# Patient Record
Sex: MALE | URBAN - METROPOLITAN AREA
[De-identification: names, ages, dates, MRNs, and addresses within clinical notes are randomized per-mention and may not be internally consistent; named-entity substitution may affect disease eponyms.]

---

## 2019-04-06 ENCOUNTER — APPOINTMENT (OUTPATIENT)
Dept: CT IMAGING | Age: 74
End: 2019-04-06
Attending: EMERGENCY MEDICINE
Payer: MEDICARE

## 2019-04-06 ENCOUNTER — HOSPITAL ENCOUNTER (EMERGENCY)
Age: 74
Discharge: HOME | End: 2019-04-06
Attending: EMERGENCY MEDICINE | Admitting: EMERGENCY MEDICINE
Payer: MEDICARE

## 2019-04-06 VITALS
DIASTOLIC BLOOD PRESSURE: 91 MMHG | TEMPERATURE: 97.7 F | RESPIRATION RATE: 16 BRPM | HEART RATE: 90 BPM | OXYGEN SATURATION: 97 % | SYSTOLIC BLOOD PRESSURE: 163 MMHG

## 2019-04-06 DIAGNOSIS — I63.29 ACUTE ARTERIAL ISCHEMIC STROKE, VERTEBROBASILAR, CEREBELLAR (HCC): Primary | ICD-10-CM

## 2019-04-06 LAB
ALBUMIN SERPL-MCNC: 3.9 G/DL (ref 3.5–5)
ALBUMIN/GLOB SERPL: 1 {RATIO} (ref 1.1–2.2)
ALP SERPL-CCNC: 95 U/L (ref 45–117)
ALT SERPL-CCNC: 18 U/L (ref 12–78)
AMPHET UR QL SCN: NEGATIVE
ANION GAP SERPL CALC-SCNC: 10 MMOL/L (ref 5–15)
APPEARANCE UR: CLEAR
AST SERPL-CCNC: 11 U/L (ref 15–37)
BACTERIA URNS QL MICRO: NEGATIVE /HPF
BARBITURATES UR QL SCN: NEGATIVE
BASOPHILS # BLD: 0 K/UL (ref 0–0.1)
BASOPHILS NFR BLD: 0 % (ref 0–1)
BENZODIAZ UR QL: NEGATIVE
BILIRUB SERPL-MCNC: 1.1 MG/DL (ref 0.2–1)
BILIRUB UR QL: NEGATIVE
BUN SERPL-MCNC: 16 MG/DL (ref 6–20)
BUN/CREAT SERPL: 13 (ref 12–20)
CALCIUM SERPL-MCNC: 8.8 MG/DL (ref 8.5–10.1)
CANNABINOIDS UR QL SCN: NEGATIVE
CHLORIDE SERPL-SCNC: 97 MMOL/L (ref 97–108)
CO2 SERPL-SCNC: 26 MMOL/L (ref 21–32)
COCAINE UR QL SCN: NEGATIVE
COLOR UR: ABNORMAL
COMMENT, HOLDF: NORMAL
CREAT SERPL-MCNC: 1.27 MG/DL (ref 0.7–1.3)
D DIMER PPP FEU-MCNC: 9.84 MG/L FEU (ref 0–0.65)
DIFFERENTIAL METHOD BLD: ABNORMAL
DRUG SCRN COMMENT,DRGCM: NORMAL
EOSINOPHIL # BLD: 0 K/UL (ref 0–0.4)
EOSINOPHIL NFR BLD: 0 % (ref 0–7)
EPITH CASTS URNS QL MICRO: ABNORMAL /LPF
ERYTHROCYTE [DISTWIDTH] IN BLOOD BY AUTOMATED COUNT: 17.1 % (ref 11.5–14.5)
ETHANOL SERPL-MCNC: <10 MG/DL
GLOBULIN SER CALC-MCNC: 3.8 G/DL (ref 2–4)
GLUCOSE BLD STRIP.AUTO-MCNC: 377 MG/DL (ref 65–100)
GLUCOSE SERPL-MCNC: 405 MG/DL (ref 65–100)
GLUCOSE UR STRIP.AUTO-MCNC: >1000 MG/DL
HCT VFR BLD AUTO: 39.5 % (ref 36.6–50.3)
HGB BLD-MCNC: 12.5 G/DL (ref 12.1–17)
HGB UR QL STRIP: NEGATIVE
HYALINE CASTS URNS QL MICRO: ABNORMAL /LPF (ref 0–5)
IMM GRANULOCYTES # BLD AUTO: 0 K/UL (ref 0–0.04)
IMM GRANULOCYTES NFR BLD AUTO: 0 % (ref 0–0.5)
INR PPP: 1 (ref 0.9–1.1)
KETONES UR QL STRIP.AUTO: 40 MG/DL
LEUKOCYTE ESTERASE UR QL STRIP.AUTO: NEGATIVE
LYMPHOCYTES # BLD: 1 K/UL (ref 0.8–3.5)
LYMPHOCYTES NFR BLD: 12 % (ref 12–49)
MCH RBC QN AUTO: 19.9 PG (ref 26–34)
MCHC RBC AUTO-ENTMCNC: 31.6 G/DL (ref 30–36.5)
MCV RBC AUTO: 62.9 FL (ref 80–99)
METHADONE UR QL: NEGATIVE
MONOCYTES # BLD: 0.3 K/UL (ref 0–1)
MONOCYTES NFR BLD: 4 % (ref 5–13)
NEUTS SEG # BLD: 7 K/UL (ref 1.8–8)
NEUTS SEG NFR BLD: 84 % (ref 32–75)
NITRITE UR QL STRIP.AUTO: NEGATIVE
NRBC # BLD: 0 K/UL (ref 0–0.01)
NRBC BLD-RTO: 0 PER 100 WBC
OPIATES UR QL: NEGATIVE
PCP UR QL: NEGATIVE
PH UR STRIP: 6 [PH] (ref 5–8)
PLATELET # BLD AUTO: 200 K/UL (ref 150–400)
PMV BLD AUTO: 10.2 FL (ref 8.9–12.9)
POTASSIUM SERPL-SCNC: 3.5 MMOL/L (ref 3.5–5.1)
PROT SERPL-MCNC: 7.7 G/DL (ref 6.4–8.2)
PROT UR STRIP-MCNC: NEGATIVE MG/DL
PROTHROMBIN TIME: 10.7 SEC (ref 9–11.1)
RBC # BLD AUTO: 6.28 M/UL (ref 4.1–5.7)
RBC #/AREA URNS HPF: ABNORMAL /HPF (ref 0–5)
RBC MORPH BLD: ABNORMAL
SAMPLES BEING HELD,HOLD: NORMAL
SERVICE CMNT-IMP: ABNORMAL
SODIUM SERPL-SCNC: 133 MMOL/L (ref 136–145)
SP GR UR REFRACTOMETRY: 1.01 (ref 1–1.03)
UROBILINOGEN UR QL STRIP.AUTO: 0.2 EU/DL (ref 0.2–1)
WBC # BLD AUTO: 8.3 K/UL (ref 4.1–11.1)
WBC URNS QL MICRO: ABNORMAL /HPF (ref 0–4)

## 2019-04-06 PROCEDURE — 74011250636 HC RX REV CODE- 250/636

## 2019-04-06 PROCEDURE — 93005 ELECTROCARDIOGRAM TRACING: CPT

## 2019-04-06 PROCEDURE — 81003 URINALYSIS AUTO W/O SCOPE: CPT

## 2019-04-06 PROCEDURE — 80053 COMPREHEN METABOLIC PANEL: CPT

## 2019-04-06 PROCEDURE — 36415 COLL VENOUS BLD VENIPUNCTURE: CPT

## 2019-04-06 PROCEDURE — 85610 PROTHROMBIN TIME: CPT

## 2019-04-06 PROCEDURE — 70496 CT ANGIOGRAPHY HEAD: CPT

## 2019-04-06 PROCEDURE — 85025 COMPLETE CBC W/AUTO DIFF WBC: CPT

## 2019-04-06 PROCEDURE — 80307 DRUG TEST PRSMV CHEM ANLYZR: CPT

## 2019-04-06 PROCEDURE — 74011636320 HC RX REV CODE- 636/320

## 2019-04-06 PROCEDURE — 96374 THER/PROPH/DIAG INJ IV PUSH: CPT

## 2019-04-06 PROCEDURE — 85379 FIBRIN DEGRADATION QUANT: CPT

## 2019-04-06 PROCEDURE — 99285 EMERGENCY DEPT VISIT HI MDM: CPT

## 2019-04-06 PROCEDURE — 70450 CT HEAD/BRAIN W/O DYE: CPT

## 2019-04-06 PROCEDURE — 0042T CT PERF W CBF: CPT

## 2019-04-06 PROCEDURE — 82962 GLUCOSE BLOOD TEST: CPT

## 2019-04-06 RX ORDER — ONDANSETRON 2 MG/ML
INJECTION INTRAMUSCULAR; INTRAVENOUS
Status: COMPLETED
Start: 2019-04-06 | End: 2019-04-06

## 2019-04-06 RX ORDER — ONDANSETRON 2 MG/ML
4 INJECTION INTRAMUSCULAR; INTRAVENOUS
Status: COMPLETED | OUTPATIENT
Start: 2019-04-06 | End: 2019-04-06

## 2019-04-06 RX ADMIN — ONDANSETRON 4 MG: 2 INJECTION INTRAMUSCULAR; INTRAVENOUS at 11:38

## 2019-04-06 RX ADMIN — IOPAMIDOL 120 ML: 755 INJECTION, SOLUTION INTRAVENOUS at 10:33

## 2019-04-06 NOTE — ED NOTES
Correct new patient being arrived. Able to correctly identify patient with license brought by daughter and information obtained through blue phone and her. Patient was originally arrived by triage RN and was difficult to obtain information from patient at first due to dizzy state and thick accent. Charge nurse aware of current situation.

## 2019-04-06 NOTE — ED PROVIDER NOTES
[facility] 
Emergency Department History & Physical Exam 
 
 
Patient Information Date of Service: 4/6/2019 Patient Name: Ricardo Coleman YOB: 1945 Medical Record Number: [id number] Primary Care Provider: No primary care provider on file. Specialists: 
 
 
 
 
History of Present Illness History Provided By:  patient Chief Complaint Patient presents with  Lethargy  
  pt started with eye pain and weakness this morning History of Present Illness:  
     Ricardo Coleman is a 74 y.o. male with no known medical history presents with odd neurologic symptoms. He is a very poor historian and cannot really give any history and there is no family at the bedside at this time. Nursing staff reports that a family member drove him to the front of the emergency department and that the nursing staff had to help him out of the vehicle and he seemed to be listing to the left side. On my questioning all he can tell me is that \"my eyes turn me over\". He can follow commands but is otherwise moaning and rolling around in the bed and questions. Past Medical History No past medical history on file. No past surgical history on file. No family history on file. Social History Tobacco Use  Smoking status: Not on file Substance Use Topics  Alcohol use: Not on file  Drug use: Not on file No Known Allergies No current facility-administered medications for this encounter. No current outpatient medications on file. Review of Systems Review of Systems Unable to perform ROS: Mental status change Physical Exam  
Vital Signs Patient Vitals for the past 12 hrs: 
 Temp Pulse Resp BP SpO2  
04/06/19 1126  90 16 (!) 163/91 97 % 04/06/19 1019 97.7 °F (36.5 °C) 77 15 (!) 176/108 100 % Physical Exam  
Constitutional: He appears well-developed and well-nourished. He is cooperative. On examination he is a elderly thin -American man laying in bed. He moans and rise around from time to time. His vital signs are normal other than an elevated blood pressure of 176/108. He is afebrile. HENT:  
Head: Normocephalic and atraumatic. Mouth/Throat: Oropharynx is clear and moist.  
 HEENT reveals no trauma and no meningismus signs are next stiffness. Eyes: Conjunctivae and EOM are normal. Right eye exhibits no discharge. Left eye exhibits no discharge. No scleral icterus. Neck: Normal range of motion and phonation normal. Neck supple. No JVD present. No thyromegaly present. Cardiovascular: Normal rate, regular rhythm, S1 normal, S2 normal, normal heart sounds and intact distal pulses. Exam reveals no gallop and no friction rub. No murmur heard. Pulmonary/Chest: Effort normal and breath sounds normal. No accessory muscle usage. No respiratory distress. He has no wheezes. He has no rhonchi. He has no rales. Abdominal: Soft. Normal appearance and bowel sounds are normal. He exhibits no distension and no pulsatile midline mass. There is no tenderness. There is no rebound and no guarding. Musculoskeletal: Normal range of motion. He exhibits no edema or deformity. Lymphadenopathy:  
     Head (right side): No submandibular adenopathy present. He has no cervical adenopathy. Neurological: He is alert and oriented to person and place and follows commands. His pupils are midsize and reactive bilaterally. Cranial nerves II through XII appear to be grossly intact bilaterally except for possibly a very mild left-sided facial droop although the patient is not very cooperative with exam so difficult to tell. He has 5 out of 5 upper extremity and lower extremity strength and normal sensation to light touch in all 4 extremities. He does not have a pronator drift.   Initially told me that he was unable to stand but with a little bit of assistance he could stand at the bedside without any assistance. He stood there with his eyes closed. When told to sit back down he flop back down on the bed onto his left side. Skin: Skin is warm and dry. No rash noted. Psychiatric: He has a normal mood and affect. His speech is normal.  
Nursing note and vitals reviewed. Labs, Radiology, EKG, Procedures, Etc. Labs:  
  
Recent Results (from the past 12 hour(s)) GLUCOSE, POC Collection Time: 04/06/19 10:20 AM  
Result Value Ref Range Glucose (POC) 377 (H) 65 - 100 mg/dL Performed by Radha Graham CBC WITH AUTOMATED DIFF Collection Time: 04/06/19 10:26 AM  
Result Value Ref Range WBC 8.3 4.1 - 11.1 K/uL  
 RBC 6.28 (H) 4.10 - 5.70 M/uL  
 HGB 12.5 12.1 - 17.0 g/dL HCT 39.5 36.6 - 50.3 % MCV 62.9 (L) 80.0 - 99.0 FL  
 MCH 19.9 (L) 26.0 - 34.0 PG  
 MCHC 31.6 30.0 - 36.5 g/dL  
 RDW 17.1 (H) 11.5 - 14.5 % PLATELET 593 455 - 409 K/uL MPV 10.2 8.9 - 12.9 FL  
 NRBC 0.0 0  WBC ABSOLUTE NRBC 0.00 0.00 - 0.01 K/uL NEUTROPHILS 84 (H) 32 - 75 % LYMPHOCYTES 12 12 - 49 % MONOCYTES 4 (L) 5 - 13 % EOSINOPHILS 0 0 - 7 % BASOPHILS 0 0 - 1 % IMMATURE GRANULOCYTES 0 0.0 - 0.5 % ABS. NEUTROPHILS 7.0 1.8 - 8.0 K/UL  
 ABS. LYMPHOCYTES 1.0 0.8 - 3.5 K/UL  
 ABS. MONOCYTES 0.3 0.0 - 1.0 K/UL  
 ABS. EOSINOPHILS 0.0 0.0 - 0.4 K/UL  
 ABS. BASOPHILS 0.0 0.0 - 0.1 K/UL  
 ABS. IMM. GRANS. 0.0 0.00 - 0.04 K/UL  
 DF SMEAR SCANNED    
 RBC COMMENTS MICROCYTOSIS 
2+ PROTHROMBIN TIME + INR Collection Time: 04/06/19 10:26 AM  
Result Value Ref Range INR 1.0 0.9 - 1.1 Prothrombin time 10.7 9.0 - 11.1 sec D DIMER Collection Time: 04/06/19 10:26 AM  
Result Value Ref Range D-dimer 9.84 (H) 0.00 - 0.65 mg/L FEU METABOLIC PANEL, COMPREHENSIVE Collection Time: 04/06/19 10:26 AM  
Result Value Ref Range Sodium 133 (L) 136 - 145 mmol/L Potassium 3.5 3.5 - 5.1 mmol/L  Chloride 97 97 - 108 mmol/L  
 CO2 26 21 - 32 mmol/L Anion gap 10 5 - 15 mmol/L Glucose 405 (H) 65 - 100 mg/dL BUN 16 6 - 20 MG/DL Creatinine 1.27 0.70 - 1.30 MG/DL  
 BUN/Creatinine ratio 13 12 - 20 GFR est AA >60 >60 ml/min/1.73m2 GFR est non-AA 55 (L) >60 ml/min/1.73m2 Calcium 8.8 8.5 - 10.1 MG/DL Bilirubin, total 1.1 (H) 0.2 - 1.0 MG/DL  
 ALT (SGPT) 18 12 - 78 U/L  
 AST (SGOT) 11 (L) 15 - 37 U/L Alk. phosphatase 95 45 - 117 U/L Protein, total 7.7 6.4 - 8.2 g/dL Albumin 3.9 3.5 - 5.0 g/dL Globulin 3.8 2.0 - 4.0 g/dL A-G Ratio 1.0 (L) 1.1 - 2.2 SAMPLES BEING HELD Collection Time: 04/06/19 10:26 AM  
Result Value Ref Range SAMPLES BEING HELD RED,GREEN   
 COMMENT Add-on orders for these samples will be processed based on acceptable specimen integrity and analyte stability, which may vary by analyte. ETHYL ALCOHOL Collection Time: 04/06/19 10:26 AM  
Result Value Ref Range ALCOHOL(ETHYL),SERUM <10 <10 MG/DL  
EKG, 12 LEAD, INITIAL Collection Time: 04/06/19 10:56 AM  
Result Value Ref Range Ventricular Rate 88 BPM  
 Atrial Rate 88 BPM  
 P-R Interval 240 ms QRS Duration 92 ms Q-T Interval 376 ms QTC Calculation (Bezet) 454 ms Calculated P Axis 69 degrees Calculated R Axis 14 degrees Calculated T Axis 11 degrees Diagnosis Sinus rhythm with 1st degree AV block Moderate voltage criteria for LVH, may be normal variant Nonspecific T wave abnormality No previous ECGs available DRUG SCREEN, URINE Collection Time: 04/06/19 11:06 AM  
Result Value Ref Range AMPHETAMINES NEGATIVE  NEG    
 BARBITURATES NEGATIVE  NEG BENZODIAZEPINES NEGATIVE  NEG    
 COCAINE NEGATIVE  NEG METHADONE NEGATIVE  NEG    
 OPIATES NEGATIVE  NEG    
 PCP(PHENCYCLIDINE) NEGATIVE  NEG    
 THC (TH-CANNABINOL) NEGATIVE  NEG Drug screen comment (NOTE) URINALYSIS W/ RFLX MICROSCOPIC Collection Time: 04/06/19 11:06 AM  
Result Value Ref Range Color YELLOW/STRAW Appearance CLEAR CLEAR Specific gravity 1.007 1.003 - 1.030    
 pH (UA) 6.0 5.0 - 8.0 Protein NEGATIVE  NEG mg/dL Glucose >1,000 (A) NEG mg/dL Ketone 40 (A) NEG mg/dL Bilirubin NEGATIVE  NEG Blood NEGATIVE  NEG Urobilinogen 0.2 0.2 - 1.0 EU/dL Nitrites NEGATIVE  NEG Leukocyte Esterase NEGATIVE  NEG    
 WBC 0-4 0 - 4 /hpf  
 RBC 0-5 0 - 5 /hpf Epithelial cells FEW FEW /lpf Bacteria NEGATIVE  NEG /hpf Hyaline cast 0-2 0 - 5 /lpf Radiologic Studies: CTA CODE NEURO HEAD AND NECK W CONT Final Result Impression: No evidence for acute large vessel arterial occlusion or significant stenosis. CT perfusion brain: Questionable focal decreased perfusion and increased blood  
volume in the peripheral cerebellar hemispheres. Nonspecific focal hypoattenuation in the peripheral left cerebellum is again  
seen. CT PERF W CBF Final Result Impression: No evidence for acute large vessel arterial occlusion or significant stenosis. CT perfusion brain: Questionable focal decreased perfusion and increased blood  
volume in the peripheral cerebellar hemispheres. Nonspecific focal hypoattenuation in the peripheral left cerebellum is again  
seen. CT CODE NEURO HEAD WO CONTRAST Final Result IMPRESSION:   
1. Round hypodensity within the left occipital lobe. Recommend MR imaging to  
help differentiate mass lesion from infarct CT Results  (Last 48 hours) 04/06/19 1055  CTA CODE NEURO HEAD AND NECK W CONT Final result Impression:  Impression: No evidence for acute large vessel arterial occlusion or significant stenosis. CT perfusion brain: Questionable focal decreased perfusion and increased blood  
volume in the peripheral cerebellar hemispheres. Nonspecific focal hypoattenuation in the peripheral left cerebellum is again  
seen. Narrative:  CLINICAL HISTORY: Eye pain and weakness EXAMINATION:  CT ANGIOGRAPHY HEAD AND NECK COMPARISON: CT head 4/6/2019 TECHNIQUE:  Following the uneventful administration of iodinated contrast  
material, axial CT angiography of the head and neck was performed. Delayed axial  
images through the head were also obtained. Coronal and sagittal reconstructions  
were obtained. Manual postprocessing of images was performed. 3-D  Sagittal  
maximal intensity projection images were obtained. 3-D Coronal maximal  
intensity projections were obtained. CT dose reduction was achieved through use  
of a standardized protocol tailored for this examination and automatic exposure  
control for dose modulation. CT perfusion analysis was performed using CT with  
contrast administration, including postprocessing of parametric maps with the  
determination of cerebral blood flow, cerebral blood volume, and mean transit  
time. FINDINGS:  
   
Delayed contrast-enhanced head CT: The ventricles are midline without hydrocephalus. There is no acute intra or  
extra-axial hemorrhage. Mild bilateral subcortical and periventricular areas of  
patchy low attenuation is nonspecific but likely related to changes of chronic  
small vessel ischemic disease. Unchanged nonspecific small focal hypoattenuation  
in the left cerebellum. The basal cisterns are clear. The paranasal sinuses are  
clear. CTA NECK:  
   
Great vessels: Patent origins with the left vertebral artery arising from the  
aortic arch, a normal variant Right subclavian artery: Patent Left subclavian artery: Patent Right common carotid artery: Patent Left common carotid artery: Patent Cervical right internal carotid artery: Patent with no significant stenosis by NASCET criteria. Cervical left internal carotid artery: Patent with no significant stenosis by NASCET criteria. Right vertebral artery: Patent Left vertebral artery: Patent Mild cervical spondylosis CTA HEAD:  
   
Right cavernous internal carotid artery: Patent Left cavernous internal carotid artery: Patent Anterior cerebral arteries: Patent Anterior communicating artery: Patent Right middle cerebral artery: Patent Left middle cerebral artery: Patent Posterior communicating arteries: Patent Posterior cerebral arteries: Patent Basilar artery: Patent Distal vertebral arteries: Patent CT perfusion brain: Questionable focal decreased perfusion and increased blood  
volume in the cerebellar hemispheres 04/06/19 1055  CT PERF W CBF Final result Impression:  Impression: No evidence for acute large vessel arterial occlusion or significant stenosis. CT perfusion brain: Questionable focal decreased perfusion and increased blood  
volume in the peripheral cerebellar hemispheres. Nonspecific focal hypoattenuation in the peripheral left cerebellum is again  
seen. Narrative:  CLINICAL HISTORY: Eye pain and weakness EXAMINATION:  CT ANGIOGRAPHY HEAD AND NECK COMPARISON: CT head 4/6/2019 TECHNIQUE:  Following the uneventful administration of iodinated contrast  
material, axial CT angiography of the head and neck was performed. Delayed axial  
images through the head were also obtained. Coronal and sagittal reconstructions  
were obtained. Manual postprocessing of images was performed. 3-D  Sagittal  
maximal intensity projection images were obtained. 3-D Coronal maximal  
intensity projections were obtained. CT dose reduction was achieved through use  
of a standardized protocol tailored for this examination and automatic exposure  
control for dose modulation.  CT perfusion analysis was performed using CT with  
contrast administration, including postprocessing of parametric maps with the  
 determination of cerebral blood flow, cerebral blood volume, and mean transit  
time. FINDINGS:  
   
Delayed contrast-enhanced head CT: The ventricles are midline without hydrocephalus. There is no acute intra or  
extra-axial hemorrhage. Mild bilateral subcortical and periventricular areas of  
patchy low attenuation is nonspecific but likely related to changes of chronic  
small vessel ischemic disease. Unchanged nonspecific small focal hypoattenuation  
in the left cerebellum. The basal cisterns are clear. The paranasal sinuses are  
clear. CTA NECK:  
   
Great vessels: Patent origins with the left vertebral artery arising from the  
aortic arch, a normal variant Right subclavian artery: Patent Left subclavian artery: Patent Right common carotid artery: Patent Left common carotid artery: Patent Cervical right internal carotid artery: Patent with no significant stenosis by NASCET criteria. Cervical left internal carotid artery: Patent with no significant stenosis by NASCET criteria. Right vertebral artery: Patent Left vertebral artery: Patent Mild cervical spondylosis CTA HEAD:  
   
Right cavernous internal carotid artery: Patent Left cavernous internal carotid artery: Patent Anterior cerebral arteries: Patent Anterior communicating artery: Patent Right middle cerebral artery: Patent Left middle cerebral artery: Patent Posterior communicating arteries: Patent Posterior cerebral arteries: Patent Basilar artery: Patent Distal vertebral arteries: Patent CT perfusion brain: Questionable focal decreased perfusion and increased blood  
volume in the cerebellar hemispheres 04/06/19 1036  CT CODE NEURO HEAD WO CONTRAST Final result Impression:  IMPRESSION:   
1. Round hypodensity within the left occipital lobe. Recommend MR imaging to help differentiate mass lesion from infarct Narrative:  EXAM: CT CODE NEURO HEAD WO CONTRAST INDICATION: stroke like symptoms COMPARISON: None. CONTRAST: None. TECHNIQUE: Unenhanced CT of the head was performed using 5 mm images. Brain and  
bone windows were generated. CT dose reduction was achieved through use of a  
standardized protocol tailored for this examination and automatic exposure  
control for dose modulation. FINDINGS:  
The ventricles and sulci are normal in size, shape and configuration and  
midline. There is a round hypodensity within the posterior left cerebellar  
marrow hemisphere. It has no significant mass effect. Normal low density within  
the periventricular white matter of the cerebral hemispheres not unexpected for  
age. There is no intracranial hemorrhage, extra-axial collection, mass, mass  
effect or midline shift. The basilar cisterns are open. No acute infarct is  
identified. The bone windows demonstrate no abnormalities. The visualized  
portions of the paranasal sinuses and mastoid air cells are clear. CXR Results  (Last 48 hours) None Pulse Oximetry Analysis: 100 % on room air Normal  
 
Cardiac Monitor:  
Rate: 77 Rhythm: Normal Sinus Rhythm Procedures:  
Procedures Medical Decision Making ASSESSMENT / PLAN: 
     Manuel Miranda is a 74 y.o. male with no known medical history presents with odd neurologic symptoms. He is a very poor historian and cannot really give any history and there is no family at the bedside at this time. Nursing staff reports that a family member drove him to the front of the emergency department and that the nursing staff had to help him out of the vehicle and he seemed to be listing to the left side. On my questioning all he can tell me is that \"my eyes turn me over\".   He can follow commands but is otherwise moaning and rolling around in the bed and questions. On examination he is a elderly thin -American man laying in bed. His vital signs are normal other than an elevated blood pressure of 176/108. He is afebrile. HEENT reveals no trauma and no meningismus signs are next stiffness. CV, lung, abdomen exam benign. Cranial nerves II through XII appear to be grossly intact bilaterally except for possibly a very mild left-sided facial droop although the patient is not very cooperative with exam so difficult to tell. He has 5 out of 5 upper extremity and lower extremity strength and normal sensation to light touch in all 4 extremities. He does not have a pronator drift. Initially told me that he was unable to stand but with a little bit of assistance he could stand at the bedside without any assistance. He stood there with his eyes closed. When told to sit back down he flop back down on the bed onto his left side. I am unsure of the cause of this presentation. Although I think unlikely to be stroke it is possible. The nursing staff did report significant listing to the left side when they initially got him out of the vehicle. On my examination he might have a very mild facial droop on the left. Blood pressure is elevated. This could be intoxication or withdrawal of some type although he does not fit into any known toxidrome at this time. There may be a psychiatric component to this as well, but that will need to be a diagnosis of exclusion. Stroke alert called CT head CTA head and neck CBC, CMP, coags Urinalysis Urine drug screen Alcohol level  Will allow permissive hypertension at this time as this could be an ischemic stroke. We will speak with telemetry neurology  Reassess Romina Izquierdo MD 
EM- Physician Old Medical Records: Old medical records. Nursing notes. Nursing Records: I reviewed available vital signs, nursing notes, PMHx, PSGHx, FMHx, SHx Care Plan for ED Visit: I am the first provider for this patient's ED visit today. Initial assessment performed. I discussed presenting problems, concerns and my formulated plan for today's visit with the patient and any available family members at bedside. I encouraged them to ask questions as they arise throughout the visit. UPDATE /RE-EVALUATION 
-Head CT is revealing for a left cerebellar ischemic stroke that appears subacute in nature. Lab work significant for normal urinalysis, normal drug screen, normal CBC, normal coags, normal CMP other than hyperglycemia 405 without anion gap or acidosis.  Spoke with telemetry neurology who requested oral aspirin and admission to the hospital for further workup Spoke with Dr. Duran [facility] hospitalist to graciously accepted this patient for admission. I spoke with the patient and family at bedside and they are comfortable with this plan 
 
Stephanie Oden MD 
EM- Physician Critical Care Time: 
Critical Care Time: The services I provided to this patient were to treat and/or prevent clinically significant deterioration that could result in the failure of one or more body systems and/or organ systems due to acute stroke. Services included the following: 
-reviewing nursing notes and old charts 
-vital sign assessments 
-direct patient care 
-medication orders and management 
-interpreting and reviewing diagnostic studies/labs 
-re-evaluations 
-documentation time Aggregate critical care time was 45 minutes, which includes only time during which I was engaged in work directly related to the patient's care as described above, whether I was at bedside or elsewhere in the Emergency Department. It did not include time spent performing other reported procedures or the services of residents, students, nurses, or advance practice providers. Maury Ortiz MD 
 
1:11 PM 
 
Medications Given in the ED: 
Medications iopamidol (ISOVUE-370) 76 % injection (120 mL  Given 4/6/19 1033) ondansetron (ZOFRAN) injection 4 mg (4 mg IntraVENous Given 4/6/19 1138) Disposition  
E.D. Clinical Impression/Diagnosis: 1. Acute arterial ischemic stroke, vertebrobasilar, cerebellar ([facility] [address] Disposition: 
Admit For Hospitalized Patients: 
1) Hospitalization Decision Time: The decision to hospitalize the patient was made by Dr. Harini Ely at 12:50 PM on April 6, 2019 2) Aspirin: Was given in the ER Follow-up Information None There are no discharge medications for this patient. 
 
 
 
 
 
_______________________________ Attestations: This note was performed by Micth Vincent MD in its entirety. _______________________________

## 2019-04-06 NOTE — ED TRIAGE NOTES
Patient arrives to the emergency department via vehicle with a chief complaint of lethargy, feeling unbalanced and spinning sensation. Patient reports waking up shortly after 6am feeling okay but then began feeling dizzy and weak. Reports while driving he felt like everything was spinning. Some difficulty communicating with patient due to a language barrier. Will obtain blue phone upon returning to patients room, was not aware before patients arrival. No family present at this time. Dr. Kal Pena met patient in room and evaluated patient. Code 1 level s called overhead and patient taken to CT by RN at that time.

## 2019-04-08 LAB
ATRIAL RATE: 88 BPM
CALCULATED P AXIS, ECG09: 69 DEGREES
CALCULATED R AXIS, ECG10: 14 DEGREES
CALCULATED T AXIS, ECG11: 11 DEGREES
DIAGNOSIS, 93000: NORMAL
P-R INTERVAL, ECG05: 240 MS
Q-T INTERVAL, ECG07: 376 MS
QRS DURATION, ECG06: 92 MS
QTC CALCULATION (BEZET), ECG08: 454 MS
VENTRICULAR RATE, ECG03: 88 BPM